# Patient Record
Sex: MALE | Race: WHITE | NOT HISPANIC OR LATINO | Employment: OTHER | URBAN - METROPOLITAN AREA
[De-identification: names, ages, dates, MRNs, and addresses within clinical notes are randomized per-mention and may not be internally consistent; named-entity substitution may affect disease eponyms.]

---

## 2024-01-09 RX ORDER — ATORVASTATIN CALCIUM 80 MG/1
1 TABLET, FILM COATED ORAL DAILY
COMMUNITY
Start: 2021-05-13

## 2024-01-09 RX ORDER — CARVEDILOL 25 MG/1
1 TABLET ORAL 2 TIMES DAILY
COMMUNITY
Start: 2015-12-29

## 2024-01-09 RX ORDER — TRIHEXYPHENIDYL HYDROCHLORIDE 2 MG/1
TABLET ORAL
COMMUNITY
Start: 2019-10-08 | End: 2024-01-10 | Stop reason: SDUPTHER

## 2024-01-09 RX ORDER — LISINOPRIL 20 MG/1
1 TABLET ORAL 2 TIMES DAILY
COMMUNITY
Start: 2015-12-29

## 2024-01-09 RX ORDER — AMLODIPINE BESYLATE 10 MG/1
1 TABLET ORAL DAILY
COMMUNITY
Start: 2015-12-29

## 2024-01-09 RX ORDER — HYDRALAZINE HYDROCHLORIDE 25 MG/1
1 TABLET, FILM COATED ORAL 2 TIMES DAILY
COMMUNITY
Start: 2016-06-01

## 2024-01-10 ENCOUNTER — OFFICE VISIT (OUTPATIENT)
Dept: NEUROLOGY | Facility: CLINIC | Age: 49
End: 2024-01-10
Payer: MEDICARE

## 2024-01-10 VITALS
SYSTOLIC BLOOD PRESSURE: 115 MMHG | WEIGHT: 246 LBS | DIASTOLIC BLOOD PRESSURE: 79 MMHG | HEART RATE: 76 BPM | RESPIRATION RATE: 16 BRPM

## 2024-01-10 DIAGNOSIS — G25.2 DYSTONIC TREMOR: Primary | ICD-10-CM

## 2024-01-10 PROCEDURE — 1036F TOBACCO NON-USER: CPT | Performed by: NURSE PRACTITIONER

## 2024-01-10 PROCEDURE — 99214 OFFICE O/P EST MOD 30 MIN: CPT | Performed by: NURSE PRACTITIONER

## 2024-01-10 RX ORDER — TRIHEXYPHENIDYL HYDROCHLORIDE 2 MG/1
2 TABLET ORAL 3 TIMES DAILY
Qty: 270 TABLET | Refills: 3 | Status: SHIPPED | OUTPATIENT
Start: 2024-01-10

## 2024-01-10 ASSESSMENT — PATIENT HEALTH QUESTIONNAIRE - PHQ9
1. LITTLE INTEREST OR PLEASURE IN DOING THINGS: NOT AT ALL
SUM OF ALL RESPONSES TO PHQ9 QUESTIONS 1 AND 2: 0
2. FEELING DOWN, DEPRESSED OR HOPELESS: NOT AT ALL

## 2024-01-10 ASSESSMENT — ENCOUNTER SYMPTOMS
DEPRESSION: 0
LOSS OF SENSATION IN FEET: 0
OCCASIONAL FEELINGS OF UNSTEADINESS: 0

## 2024-01-10 NOTE — PROGRESS NOTES
Subjective     Jp Kim is a 48 y.o. year old male who presents with Tremors, here for follow up visit.    HPI    Here for f/u RUE dystonic tremor.     No worse, but overall much better than prior to being on the Artane in 2017 or 2018. No interference with ADLs (eating, drinking, dressing or others).     Continues to follow with Dr. Cabrera.     Balance issues once or twice the past month-very mild, leans to the L, denies falls. Stroke affected the R side. Still has weakness in the R arm.   He has pins and needles feeling in his RUE and RLE and so he states he moves it around for this reason- R arm and leg movements are not bothersome.     Meds  Trihexyphenidyl 2mg 3 times a day  SE: denies       Patient Health Questionnaire-2 Score: 0            Current Outpatient Medications:     amLODIPine (Norvasc) 10 mg tablet, Take 1 tablet (10 mg) by mouth once daily., Disp: , Rfl:     atorvastatin (Lipitor) 80 mg tablet, Take 1 tablet (80 mg) by mouth once daily., Disp: , Rfl:     carvedilol (Coreg) 25 mg tablet, Take 1 tablet (25 mg) by mouth 2 times a day., Disp: , Rfl:     hydrALAZINE (Apresoline) 25 mg tablet, Take 1 tablet (25 mg) by mouth 2 times a day., Disp: , Rfl:     lisinopril 20 mg tablet, Take 1 tablet (20 mg) by mouth 2 times a day., Disp: , Rfl:     trihexyphenidyl (Artane) 2 mg tablet, Take 1 tablet (2 mg) by mouth 3 times a day., Disp: 270 tablet, Rfl: 3       Objective   Vitals:    01/10/24 1324   BP: 115/79   BP Location: Left arm   Patient Position: Sitting   BP Cuff Size: Large adult   Pulse: 76   Resp: 16   Weight: 112 kg (246 lb)                 Physical Exam  Strength in RUE RLE 4/5, LUE/LLE 5/5  Choreathetosis RUE, RLE (as d/w Dr. Velasco)  Dystonic posturing R fingers with irregular jerky tremor when arms outstretched      Assessment/Plan   Mr. Jp Kim is a 48 y.o. M with prior left basal ganglia intracerebral hemorrhage here for follow-up of RUE dystonic tremor.  Choreathesosis is present in RUE and RLE and not bothersome. He is satisfied with tremor control on Artane, tolerating without SE and declines to make adjustments today.     Diagnoses and all orders for this visit:  Dystonic tremor  -     trihexyphenidyl (Artane) 2 mg tablet; Take 1 tablet (2 mg) by mouth 3 times a day.      #Continue trihexyphenidyl 2mg 3 times a day    #Follow up in 1 year with Dr. Rod Velasco stopped in to see patient for a few moments per patient request, continue Artane as planned.     Malcolm Mason NP-C  Adult/Gerontological Nurse Practitioner   [facility], Department of Neurology  [facility]  [facility]  [address] [place] [address]  Phone: [phone]  Fax: [phone]

## 2025-01-14 ENCOUNTER — APPOINTMENT (OUTPATIENT)
Dept: NEUROLOGY | Facility: CLINIC | Age: 50
End: 2025-01-14
Payer: MEDICARE

## 2025-01-14 VITALS
WEIGHT: 248 LBS | DIASTOLIC BLOOD PRESSURE: 79 MMHG | RESPIRATION RATE: 18 BRPM | HEART RATE: 75 BPM | SYSTOLIC BLOOD PRESSURE: 125 MMHG

## 2025-01-14 DIAGNOSIS — I61.9: Primary | ICD-10-CM

## 2025-01-14 DIAGNOSIS — G25.2 DYSTONIC TREMOR: ICD-10-CM

## 2025-01-14 DIAGNOSIS — G25.5: Primary | ICD-10-CM

## 2025-01-14 PROCEDURE — 99213 OFFICE O/P EST LOW 20 MIN: CPT | Performed by: PSYCHIATRY & NEUROLOGY

## 2025-01-14 PROCEDURE — 1036F TOBACCO NON-USER: CPT | Performed by: PSYCHIATRY & NEUROLOGY

## 2025-01-14 RX ORDER — TRIHEXYPHENIDYL HYDROCHLORIDE 2 MG/1
2 TABLET ORAL 3 TIMES DAILY
Qty: 270 TABLET | Refills: 3 | Status: SHIPPED | OUTPATIENT
Start: 2025-01-14

## 2025-01-14 RX ORDER — DULOXETIN HYDROCHLORIDE 60 MG/1
CAPSULE, DELAYED RELEASE ORAL
COMMUNITY
Start: 2025-01-06

## 2025-01-14 ASSESSMENT — ENCOUNTER SYMPTOMS
DEPRESSION: 0
OCCASIONAL FEELINGS OF UNSTEADINESS: 0
LOSS OF SENSATION IN FEET: 0

## 2025-01-14 ASSESSMENT — PATIENT HEALTH QUESTIONNAIRE - PHQ9: 2. FEELING DOWN, DEPRESSED OR HOPELESS: NOT AT ALL

## 2025-01-14 NOTE — PATIENT INSTRUCTIONS
Mr. Villaseñor,    You were seen at [facility] neurology clinic by Dr. Velasco for your dystonic tremor. You are currently doing well on the trihexyphenidyl, we will keep you on the same dose of 2mg three times per day. In the future, we can always go up on this dose or consider botox injections. Please reach out by phone with any questions or if you want to change the medication.     Thank you, it was a pleasure taking care of you!

## 2025-01-14 NOTE — PROGRESS NOTES
"Subjective   Jp Kim is a 49 y.o. year old RH male with L BG ICH (11/2015) who presents for follow up of dystonic tremor and choreoathetosis.     HPI  Pt is doing \"fine\", tremors and choreoathetosis do not bother him much. Able to do ADLs independently, with some adaptations such as using his L hand to brush his teeth. Symptoms are worsened with stress and he did have a very stressful year, but overall feels stable on his current dose of Trihexyphenidyl 2mg TID. Denies any SE, feels overall symptoms are better since before starting Artane in 2017 or 2018.    No big issues with balance, no recent falls. 1-2 in past 10 years (once tripped on something). Feels the strength in R side has plateaued, as has the paraesthesias (pins and needles in R arm/face/leg, some numbness. On duloxetine now, gabapentin was not effective.    Current Meds:  - Trihexyphenidyl 2mg 3 times a day      Current Outpatient Medications:     DULoxetine (Cymbalta) 60 mg DR capsule, TAKE 1 CAPSULE BY MOUTH ONCE DAILY (UPTITRATION), Disp: , Rfl:     amLODIPine (Norvasc) 10 mg tablet, Take 1 tablet (10 mg) by mouth once daily., Disp: , Rfl:     atorvastatin (Lipitor) 80 mg tablet, Take 1 tablet (80 mg) by mouth once daily., Disp: , Rfl:     carvedilol (Coreg) 25 mg tablet, Take 1 tablet (25 mg) by mouth 2 times a day., Disp: , Rfl:     hydrALAZINE (Apresoline) 25 mg tablet, Take 1 tablet (25 mg) by mouth 2 times a day., Disp: , Rfl:     lisinopril 20 mg tablet, Take 1 tablet (20 mg) by mouth 2 times a day., Disp: , Rfl:     trihexyphenidyl (Artane) 2 mg tablet, Take 1 tablet (2 mg) by mouth 3 times a day., Disp: 270 tablet, Rfl: 3     Objective   Vitals:    01/14/25 1251   BP: 125/79   BP Location: Left arm   Patient Position: Sitting   BP Cuff Size: Large adult   Pulse: 75   Resp: 18   Weight: 112 kg (248 lb)      Physical Exam  - Choreathetosis RUE, RLE while sitting and throughout the exam  - With arms outstretched, dystonic posturing R " fingers with irregular jerky tremor. Overlay of the choreoathetosis.   - Strength 5/5 L side. R side overall 4+/5, impacted by the tremor and choreoathetosis     Assessment/Plan   Mr. Jp Kim is a 49 y.o. M with prior L BG ICH (11/2015) here for follow-up of RUE dystonic tremor and R-sided choreoathetosis. Symptoms are stable and do not greatly interfere with his life, pt does not wish to increase the trihexyphenidyl today. Discussed the possibility of Botox, pt declined at this time.    Plan:  - Continue trihexyphenidyl 2mg TID  - Follow up in 1 year with Dr. Rod Jaimes MD   PGY-3 Neurology

## 2026-01-13 ENCOUNTER — APPOINTMENT (OUTPATIENT)
Dept: NEUROLOGY | Facility: CLINIC | Age: 51
End: 2026-01-13
Payer: MEDICARE

## 2026-01-14 ENCOUNTER — APPOINTMENT (OUTPATIENT)
Dept: NEUROLOGY | Facility: CLINIC | Age: 51
End: 2026-01-14
Payer: MEDICARE

## 2026-01-21 ENCOUNTER — APPOINTMENT (OUTPATIENT)
Dept: NEUROLOGY | Facility: CLINIC | Age: 51
End: 2026-01-21
Payer: MEDICARE